# Patient Record
Sex: MALE | Race: WHITE | URBAN - METROPOLITAN AREA
[De-identification: names, ages, dates, MRNs, and addresses within clinical notes are randomized per-mention and may not be internally consistent; named-entity substitution may affect disease eponyms.]

---

## 2020-08-11 ENCOUNTER — OFFICE VISIT - HEALTHEAST (OUTPATIENT)
Dept: FAMILY MEDICINE | Facility: CLINIC | Age: 30
End: 2020-08-11

## 2020-08-11 DIAGNOSIS — H65.03 NON-RECURRENT ACUTE SEROUS OTITIS MEDIA OF BOTH EARS: ICD-10-CM

## 2021-05-26 VITALS
RESPIRATION RATE: 18 BRPM | SYSTOLIC BLOOD PRESSURE: 150 MMHG | DIASTOLIC BLOOD PRESSURE: 107 MMHG | TEMPERATURE: 98.2 F | OXYGEN SATURATION: 97 % | HEART RATE: 88 BPM

## 2021-06-10 NOTE — PROGRESS NOTES
URGENT CARE VISIT:    SUBJECTIVE:   Shabbir Perkins is a 30 y.o. male presenting with a chief complaint of bilateral ear pain and muffled hearing.  Onset was 1 week(s) ago. He denies the following symptoms: fever, chills, nasal congestion, sore throat, cough, dyspnea and sneezing. Course of illness is comes and goes. Treatment measures tried include none with no relief of symptoms.  Predisposing factors include none.    PMH: History reviewed. No pertinent past medical history.  Allergies: Patient has no known allergies.   Medications:   No current outpatient medications on file.     No current facility-administered medications for this visit.      Social History:   Social History     Tobacco Use     Smoking status: Never Smoker     Smokeless tobacco: Never Used   Substance Use Topics     Alcohol use: Not on file        ROS:  Review of systems negative except as stated above.    OBJECTIVE:  BP (!) 150/107   Pulse 88   Temp 98.2  F (36.8  C) (Oral)   Resp 18   SpO2 97%     GENERAL APPEARANCE: healthy, alert and no distress  EYES: conjunctiva clear  HENT: Fluid behind bilateral TMs. Normal external canal. Non-erythematous oropharynx. Uvula midline.   NECK: supple, nontender, no lymphadenopathy  RESP: lungs clear to auscultation - no rales, rhonchi or wheezes  CV: regular rate and rhythm, normal S1 S2, no murmur noted  SKIN: no suspicious lesions or rashes    ASSESSMENT:  1. Non-recurrent acute serous otitis media of both ears         PLAN:  Patient Instructions   Patient was educated on the natural course of condition which typically resolves on its own. Usually takes around 2 weeks to clear, but some cases can take up to 8 weeks. Conservative measures discussed including over-the-counter Pseudofed. See your primary care provider if symptoms do not improve in 2 weeks. Seek emergency care if you develop severe ear pain, swelling, or redness.      Patient verbalized understanding and is agreeable to plan. The patient  was discharged ambulatory and in stable condition.    Avril Montelongo ....................  8/11/2020   1:57 PM

## 2021-06-10 NOTE — PATIENT INSTRUCTIONS - HE
Patient was educated on the natural course of condition which typically resolves on its own. Usually takes around 2 weeks to clear, but some cases can take up to 8 weeks. Conservative measures discussed including over-the-counter Pseudofed. See your primary care provider if symptoms do not improve in 2 weeks. Seek emergency care if you develop severe ear pain, swelling, or redness.